# Patient Record
Sex: FEMALE | URBAN - METROPOLITAN AREA
[De-identification: names, ages, dates, MRNs, and addresses within clinical notes are randomized per-mention and may not be internally consistent; named-entity substitution may affect disease eponyms.]

---

## 2017-04-10 ENCOUNTER — THERAPY VISIT (OUTPATIENT)
Dept: PHYSICAL THERAPY | Facility: CLINIC | Age: 57
End: 2017-04-10
Payer: COMMERCIAL

## 2017-04-10 DIAGNOSIS — M47.812 CERVICAL SPONDYLOSIS WITHOUT MYELOPATHY: Primary | ICD-10-CM

## 2017-04-10 DIAGNOSIS — M54.2 NECK PAIN: ICD-10-CM

## 2017-04-10 PROCEDURE — 97162 PT EVAL MOD COMPLEX 30 MIN: CPT | Mod: GP | Performed by: PHYSICAL THERAPIST

## 2017-04-10 PROCEDURE — 97110 THERAPEUTIC EXERCISES: CPT | Mod: GP | Performed by: PHYSICAL THERAPIST

## 2017-04-10 PROCEDURE — 97530 THERAPEUTIC ACTIVITIES: CPT | Mod: GP | Performed by: PHYSICAL THERAPIST
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
## 2017-04-10 NOTE — MR AVS SNAPSHOT
After Visit Summary   4/10/2017    Rosaura Guzmán    MRN: [id number]           Patient Information     Date Of Birth          1960        Visit Information        Provider Department      4/10/2017 4:20 PM Braulio Schuster [facility] [place] [facility]        Today's Diagnoses     Cervical spondylosis without myelopathy    -  1    Neck pain           Follow-ups after your visit        Your next 10 appointments already scheduled     Apr 17, 2017  4:20 PM CDT   [facility] Spine with Braulio Schuster [facility] [place] [facility] ([facility] [place])    [address] [place] [address]  [address]  [place] [address]   [phone]            Apr 24, 2017  4:20 PM CDT   LUCERO Spine with Rahul Weber [facility] [place] [facility] ([facility] [place])    [address] [place] [address]  [address]  [place] [address]   [phone]            May 01, 2017  4:20 PM CDT   LUCERO Spine with Braulio Schuster [facility] [place] [facility] ([facility] [place])    [address]  [place] [address]   [phone]            May 08, 2017  4:20 PM CDT   LUCERO Spine with Braulio Schuster [facility] [place] [facility] ([facility] [place])    [address]  [place] [address]   [phone]            May 15, 2017  4:20 PM CDT   LUCERO Spine with Braulio Schuster [facility] [place] [facility] ([facility] [place])    [address] [place], [place] [address]  [place] [address]   [phone]            May 22, 2017  4:20 PM CDT   LUCERO Spine with Braulio Schuster [facility] [place] [facility] ([facility] [place])    [address] [place], [place] [address]  [place] [id number] [phone]              Who to contact     If you have questions or need follow up information about  "today's clinic visit or your schedule please contact [facility] directly at [phone].  Normal or non-critical lab and imaging results will be communicated to you by MyChart, letter or phone within 4 business days after the clinic has received the results. If you do not hear from us within 7 days, please contact the clinic through LogMeInhart or phone. If you have a critical or abnormal lab result, we will notify you by phone as soon as possible.  Submit refill requests through Zoodig or call your pharmacy and they will forward the refill request to us. Please allow 3 business days for your refill to be completed.          Additional Information About Your Visit        LogMeInharKincast Information     Zoodig lets you send messages to your doctor, view your test results, renew your prescriptions, schedule appointments and more. To sign up, go to [email or web address] . Click on \"Log in\" on the left side of the screen, which will take you to the Welcome page. Then click on \"Sign up Now\" on the right side of the page.     You will be asked to enter the access code listed below, as well as some personal information. Please follow the directions to create your username and password.     Your access code is: [email or web address]  Expires: 2017  9:09 PM     Your access code will  in 90 days. If you need help or a new code, please call your [facility] clinic or [phone].        Care EveryWhere ID     This is your Care EveryWhere ID. This could be used by other organizations to access your [facility] medical records  [id number]         Blood Pressure from Last 3 Encounters:   No data found for BP    Weight from Last 3 Encounters:   No data found for Wt              We Performed the Following     L[facility] INITIAL EVAL REPORT     PT Eval, Moderate Complexity (49392)     THERAPEUTIC ACTIVITIES     THERAPEUTIC EXERCISES        Primary Care Provider    None Specified       No primary " provider on file.        Thank you!     Thank you for choosing [facility]  for your care. Our goal is always to provide you with excellent care. Hearing back from our patients is one way we can continue to improve our services. Please take a few minutes to complete the written survey that you may receive in the mail after your visit with us. Thank you!             Your Updated Medication List - Protect others around you: Learn how to safely use, store and throw away your medicines at www.disposemymeds.org.      Notice  As of 4/10/2017  9:09 PM    You have not been prescribed any medications.
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
## 2017-04-13 NOTE — PROGRESS NOTES
Subjective:    Patient is a 57 year old female presenting with rehab left ankle/foot hpi.                                      Pertinent medical history includes:  Migraines (Pain at night/rest, numbness/tingling, weakness).    Surgical history: Foot 1992.  Current medications:  Hormone replacement therapy and pain medication.  Current occupation .    Primary job tasks include:  Prolonged sitting, prolonged standing and driving (computer work).              Oswestry Score: 37.78 %                 Objective:    System    Physical Exam    General     ROS    Assessment/Plan:

## 2017-04-17 ENCOUNTER — THERAPY VISIT (OUTPATIENT)
Dept: PHYSICAL THERAPY | Facility: CLINIC | Age: 57
End: 2017-04-17
Payer: COMMERCIAL

## 2017-04-17 DIAGNOSIS — M54.2 NECK PAIN: ICD-10-CM

## 2017-04-17 DIAGNOSIS — M47.812 CERVICAL SPONDYLOSIS WITHOUT MYELOPATHY: ICD-10-CM

## 2017-04-17 PROCEDURE — 97140 MANUAL THERAPY 1/> REGIONS: CPT | Mod: GP | Performed by: PHYSICAL THERAPIST

## 2017-04-17 PROCEDURE — 97110 THERAPEUTIC EXERCISES: CPT | Mod: GP | Performed by: PHYSICAL THERAPIST

## 2017-04-24 ENCOUNTER — THERAPY VISIT (OUTPATIENT)
Dept: PHYSICAL THERAPY | Facility: CLINIC | Age: 57
End: 2017-04-24
Payer: COMMERCIAL

## 2017-04-24 DIAGNOSIS — M47.812 CERVICAL SPONDYLOSIS WITHOUT MYELOPATHY: ICD-10-CM

## 2017-04-24 DIAGNOSIS — M54.2 NECK PAIN: ICD-10-CM

## 2017-04-24 PROCEDURE — 97110 THERAPEUTIC EXERCISES: CPT | Mod: GP

## 2017-04-24 PROCEDURE — 97112 NEUROMUSCULAR REEDUCATION: CPT | Mod: GP

## 2017-05-05 ENCOUNTER — THERAPY VISIT (OUTPATIENT)
Dept: PHYSICAL THERAPY | Facility: CLINIC | Age: 57
End: 2017-05-05
Payer: COMMERCIAL

## 2017-05-05 DIAGNOSIS — M54.2 NECK PAIN: ICD-10-CM

## 2017-05-05 DIAGNOSIS — M47.812 CERVICAL SPONDYLOSIS WITHOUT MYELOPATHY: ICD-10-CM

## 2017-05-05 PROCEDURE — 97110 THERAPEUTIC EXERCISES: CPT | Mod: GP | Performed by: PHYSICAL THERAPIST

## 2017-05-05 PROCEDURE — 97112 NEUROMUSCULAR REEDUCATION: CPT | Mod: GP | Performed by: PHYSICAL THERAPIST

## 2017-05-05 NOTE — PROGRESS NOTES
Subjective:    HPI                    Objective:    System    Physical Exam    General     ROS    Assessment/Plan:      SUBJE  Subjective changes as noted by pt:  No change in cervical spine Subjective: Pt reports LBP flare up today for unknown reasons.  cervical symptoms unchanged.     Current pain level: 8/10 Current Pain level: 8/10   Changes in function:  None     Adverse reaction to treatment or activity:  None    OBJECTIVE  Changes in objective findings:   Objective: guarded posture, LBP at 8/10 prior to pain meds. See MD today for shots in the cervical spine.     ASSESSMENT  Rosaura continues to require intervention to meet STG and LTG's: PT  Patient is progressing as expected.  Response to therapy has shown an improvement in  pain level  Progress made towards STG/LTG?  Yes (See Goal flowsheet attached for updates on achievement of STG and LTG)    PLAN  Current treatment program is being advanced to more complex exercises.    PTA/ATC plan:  N/A    Please refer to the daily flowsheet for treatment today, total treatment time and time spent performing 1:1 timed codes.

## 2017-05-05 NOTE — MR AVS SNAPSHOT
"              After Visit Summary   5/5/2017    Rosaura Guzmán    MRN: [id number]           Patient Information     Date Of Birth          1960        Visit Information        Provider Department      5/5/2017 9:30 AM Anitra Yan, PT [facility]        Today's Diagnoses     Cervical spondylosis without myelopathy        Neck pain           Follow-ups after your visit        Your next 10 appointments already scheduled     May 10, 2017  3:50 PM CDT   [facility] with Anh Dumont PT   [facility] ([facility] [place])    [address] [place] [address]  [id number]  [place] [address] [phone]              Who to contact     If you have questions or need follow up information about today's clinic visit or your schedule please contact [facility] directly at [phone].  Normal or non-critical lab and imaging results will be communicated to you by Ripple Commercehart, letter or phone within 4 business days after the clinic has received the results. If you do not hear from us within 7 days, please contact the clinic through TrashOutt or phone. If you have a critical or abnormal lab result, we will notify you by phone as soon as possible.  Submit refill requests through [employer] or call your pharmacy and they will forward the refill request to us. Please allow 3 business days for your refill to be completed.          Additional Information About Your Visit        Ripple [employer] Information     [employer] lets you send messages to your doctor, view your test results, renew your prescriptions, schedule appointments and more. To sign up, go to [email or web address] . Click on \"Log in\" on the left side of the screen, which will take you to the Welcome page. Then click on \"Sign up Now\" on the right side of the page.     You will be asked to enter the access code listed below, as well as " some personal information. Please follow the directions to create your username and password.     Your access code is: [email or web address]  Expires: 2017  9:09 PM     Your access code will  in 90 days. If you need help or a new code, please call your [place] clinic or [phone].        Care EveryWhere ID     This is your Care EveryWhere ID. This could be used by other organizations to access your [place] medical records  [id number]         Blood Pressure from Last 3 Encounters:   No data found for BP    Weight from Last 3 Encounters:   No data found for Wt              We Performed the Following     Neuromuscular Re-Education     Therapeutic Exercises        Primary Care Provider    None Specified       No primary provider on file.        Thank you!     Thank you for choosing [facility]  for your care. Our goal is always to provide you with excellent care. Hearing back from our patients is one way we can continue to improve our services. Please take a few minutes to complete the written survey that you may receive in the mail after your visit with us. Thank you!             Your Updated Medication List - Protect others around you: Learn how to safely use, store and throw away your medicines at www.disposemymeds.org.      Notice  As of 2017 12:24 PM    You have not been prescribed any medications.

## 2018-02-05 ENCOUNTER — THERAPY VISIT (OUTPATIENT)
Dept: PHYSICAL THERAPY | Facility: CLINIC | Age: 58
End: 2018-02-05
Payer: COMMERCIAL

## 2018-02-05 DIAGNOSIS — M54.50 LUMBAGO: Primary | ICD-10-CM

## 2018-02-05 PROCEDURE — 97110 THERAPEUTIC EXERCISES: CPT | Mod: GP | Performed by: PHYSICAL THERAPIST

## 2018-02-05 PROCEDURE — 97161 PT EVAL LOW COMPLEX 20 MIN: CPT | Mod: GP | Performed by: PHYSICAL THERAPIST
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
## 2018-02-09 ENCOUNTER — THERAPY VISIT (OUTPATIENT)
Dept: PHYSICAL THERAPY | Facility: CLINIC | Age: 58
End: 2018-02-09
Payer: COMMERCIAL

## 2018-02-09 DIAGNOSIS — M54.50 LUMBAGO: ICD-10-CM

## 2018-02-09 PROCEDURE — 97110 THERAPEUTIC EXERCISES: CPT | Mod: GP | Performed by: PHYSICAL THERAPIST

## 2018-02-09 PROCEDURE — 97140 MANUAL THERAPY 1/> REGIONS: CPT | Mod: GP | Performed by: PHYSICAL THERAPIST

## 2018-02-09 PROCEDURE — 97010 HOT OR COLD PACKS THERAPY: CPT | Mod: GP | Performed by: PHYSICAL THERAPIST

## 2018-02-12 ENCOUNTER — THERAPY VISIT (OUTPATIENT)
Dept: PHYSICAL THERAPY | Facility: CLINIC | Age: 58
End: 2018-02-12
Payer: COMMERCIAL

## 2018-02-12 DIAGNOSIS — M54.50 LUMBAGO: ICD-10-CM

## 2018-02-12 PROCEDURE — 97140 MANUAL THERAPY 1/> REGIONS: CPT | Mod: GP | Performed by: PHYSICAL THERAPIST

## 2018-02-12 PROCEDURE — 97110 THERAPEUTIC EXERCISES: CPT | Mod: GP | Performed by: PHYSICAL THERAPIST

## 2018-02-12 PROCEDURE — 97010 HOT OR COLD PACKS THERAPY: CPT | Mod: GP | Performed by: PHYSICAL THERAPIST

## 2018-02-16 ENCOUNTER — THERAPY VISIT (OUTPATIENT)
Dept: PHYSICAL THERAPY | Facility: CLINIC | Age: 58
End: 2018-02-16
Payer: COMMERCIAL

## 2018-02-16 DIAGNOSIS — M54.50 LUMBAGO: ICD-10-CM

## 2018-02-16 PROCEDURE — 97140 MANUAL THERAPY 1/> REGIONS: CPT | Mod: GP | Performed by: PHYSICAL THERAPIST

## 2018-02-16 PROCEDURE — 97530 THERAPEUTIC ACTIVITIES: CPT | Mod: GP | Performed by: PHYSICAL THERAPIST

## 2018-02-16 PROCEDURE — 97010 HOT OR COLD PACKS THERAPY: CPT | Mod: GP | Performed by: PHYSICAL THERAPIST

## 2018-02-20 ENCOUNTER — THERAPY VISIT (OUTPATIENT)
Dept: PHYSICAL THERAPY | Facility: CLINIC | Age: 58
End: 2018-02-20
Payer: COMMERCIAL

## 2018-02-20 DIAGNOSIS — M54.5 ACUTE LOW BACK PAIN, UNSPECIFIED BACK PAIN LATERALITY, WITH SCIATICA PRESENCE UNSPECIFIED: ICD-10-CM

## 2018-02-20 PROBLEM — M54.2 NECK PAIN: Status: RESOLVED | Noted: 2017-04-10 | Resolved: 2018-02-20

## 2018-02-20 PROBLEM — M47.812 CERVICAL SPONDYLOSIS WITHOUT MYELOPATHY: Status: RESOLVED | Noted: 2017-04-10 | Resolved: 2018-02-20

## 2018-02-20 PROCEDURE — 97110 THERAPEUTIC EXERCISES: CPT | Mod: GP

## 2018-02-20 PROCEDURE — 97010 HOT OR COLD PACKS THERAPY: CPT | Mod: GP

## 2018-02-20 PROCEDURE — 97035 APP MDLTY 1+ULTRASOUND EA 15: CPT | Mod: GP

## 2018-02-23 ENCOUNTER — THERAPY VISIT (OUTPATIENT)
Dept: PHYSICAL THERAPY | Facility: CLINIC | Age: 58
End: 2018-02-23
Payer: COMMERCIAL

## 2018-02-23 DIAGNOSIS — M54.5 ACUTE LOW BACK PAIN, UNSPECIFIED BACK PAIN LATERALITY, WITH SCIATICA PRESENCE UNSPECIFIED: ICD-10-CM

## 2018-02-23 PROCEDURE — 97010 HOT OR COLD PACKS THERAPY: CPT | Mod: GP | Performed by: PHYSICAL THERAPIST

## 2018-02-23 PROCEDURE — 97530 THERAPEUTIC ACTIVITIES: CPT | Mod: GP | Performed by: PHYSICAL THERAPIST

## 2018-02-23 PROCEDURE — 97035 APP MDLTY 1+ULTRASOUND EA 15: CPT | Mod: GP | Performed by: PHYSICAL THERAPIST

## 2018-02-23 NOTE — MR AVS SNAPSHOT
"              After Visit Summary   2018    Rosaura Guzmán    MRN: [id number]           Patient Information     Date Of Birth          1960        Visit Information        Provider Department      2018 4:30 PM Raza Watts PT [place] [facility] Physical Therapy        Today's Diagnoses     Acute low back pain, unspecified back pain laterality, with sciatica presence unspecified           Follow-ups after your visit        Who to contact     If you have questions or need follow up information about today's clinic visit or your schedule please contact [facility]THLETIC [facility] PHYSICAL THERAPY directly at [phone].  Normal or non-critical lab and imaging results will be communicated to you by [employer]ou[employer]art, letter or phone within 4 business days after the clinic has received the results. If you do not hear from us within 7 days, please contact the clinic through [employer] or phone. If you have a critical or abnormal lab result, we will notify you by phone as soon as possible.  Submit refill requests through [email or web address] or call your pharmacy and they will forward the refill request to us. Please allow 3 business days for your refill to be completed.          Additional Information About Your Visit        MyChart Information     [email or web address] lets you send messages to your doctor, view your test results, renew your prescriptions, schedule appointments and more. To sign up, go to [email or web address] . Click on \"Log in\" on the left side of the screen, which will take you to the Welcome page. Then click on \"Sign up Now\" on the right side of the page.     You will be asked to enter the access code listed below, as well as some personal information. Please follow the directions to create your username and password.     Your access code is: [email or web address]  Expires: 2018  5:45 PM     Your access code will  in 90 days. If you need help or a new code, please call " your [place] clinic or [phone].        Care EveryWhere ID     This is your Care EveryWhere ID. This could be used by other organizations to access your [place] medical records  [id number]         Blood Pressure from Last 3 Encounters:   No data found for BP    Weight from Last 3 Encounters:   No data found for Wt              We Performed the Following     HOT OR COLD PACKS THERAPY     THERAPEUTIC ACTIVITIES     ULTRASOUND THERAPY        Primary Care Provider Fax #    Physician No Ref-Primary [phone]       No address on file        Equal Access to Services     JESSE LEPE : Hadii aad ku hadasho Soomaali, waaxda luqadaha, qaybta kaalmada adeegyada, waxay idiin hayaan adeeg kharaluann laobey . So [facility] [phone].    ATENCIÓN: Si habla español, tiene a goff disposición servicios gratuitos de asistencia lingüística. Ll[facility] [phone].    We comply with applicable federal civil rights laws and [place] laws. We do not discriminate on the basis of race, color, national origin, age, disability, sex, sexual orientation, or gender identity.            Thank you!     Thank you for choosing [facility]  for your care. Our goal is always to provide you with excellent care. Hearing back from our patients is one way we can continue to improve our services. Please take a few minutes to complete the written survey that you may receive in the mail after your visit with us. Thank you!             Your Updated Medication List - Protect others around you: Learn how to safely use, store and throw away your medicines at www.disposemymeds.org.      Notice  As of 2/23/2018  5:03 PM    You have not been prescribed any medications.

## 2018-02-23 NOTE — LETTER
[facility] PHYSICAL [address]  [address]  [facility] [id number] [phone]    2018    Re: Rosaura Guzmán   :   1960  MRN:  [id number]   REFERRING PHYSICIAN:   Parviz Maier    [facility]LETIC [facility]    Date of Initial Evaluation:  18  Visits:  Rxs Used: 6  Reason for Referral:  Acute low back pain, unspecified back pain laterality, with sciatica presence unspecified    PROGRESS  REPORT  Progress reporting period is from 18 to 18.       SUBJECTIVE    Subjective changes noted by patient:  Minimal changes noted in LBP over the course of PT.  Subjective: Reports major flare up of pain over the past 24 hours for no apparent reason. Notes pain across B LB. [place] that US was helpful last visit.    Current pain level is 9/10  .     Previous pain level was  4/10 Initial Pain level: 5/10.   Changes in function:  None  Adverse reaction to treatment or activity: activity - Patient reported major increase in sxs over the 24 hours prior to her last PT session for no apparent reason.    OBJECTIVE    Changes noted in objective findings:  None  Objective: Continued with HP followed by US in L sidelying.     ASSESSMENT/PLAN    Updated problem list and treatment plan: Diagnosis 1:  LBP  Pain -  hot/cold therapy, electric stimulation, manual therapy, self management, education and home program  Decreased ROM/flexibility - manual therapy, therapeutic exercise and home program  Impaired muscle performance - neuro re-education and home program  Decreased function - therapeutic activities and home program  STG/LTGs have been met or progress has been made towards goals:  None  Assessment of Progress: The patient's condition is unchanged.  Self Management Plans:  Patient has been instructed in a home treatment program.  I have re-evaluated this patient and find that the nature, scope, duration  and intensity of the therapy is appropriate for the medical condition of the patient.  Rosaura continues to require the following intervention to meet STG and LTG's:  PT intervention is no longer required to meet STG/LTG.    Re: Rosaura Guzmán   :   1960        Recommendations:    This patient would benefit from further evaluation.    Thank you for your referral.    INQUIRIES  Therapist:  Raza Watts PT   [facility]  [address] [place] [address]  [facility] [address]  Phone: [phone]  Fax: [phone]

## 2018-03-01 NOTE — PROGRESS NOTES
Subjective:  HPI                    Objective:  System    Physical Exam    General     ROS    Assessment/Plan:    PROGRESS  REPORT    Progress reporting period is from 2-5-18 to 2-23-18.       SUBJECTIVE  Subjective changes noted by patient:  Minimal changes noted in LBP over the course of PT.  Subjective: Reports major flare up of pain over the past 24 hours for no apparent reason. Notes pain across B LB. [place] that US was helpful last visit.    Current pain level is 9/10  .     Previous pain level was  4/10 Initial Pain level: 5/10.   Changes in function:  None  Adverse reaction to treatment or activity: activity - Patient reported major increase in sxs over the 24 hours prior to her last PT session for no apparent reason.    OBJECTIVE  Changes noted in objective findings:  None  Objective: Continued with HP followed by US in L sid[place]ing.     ASSESSMENT/PLAN  Updated problem list and treatment plan: Diagnosis 1:  LBP  Pain -  hot/cold therapy, electric stimulation, manual therapy, self management, education and home program  Decreased ROM/flexibility - manual therapy, therapeutic exercise and home program  Impaired muscle performance - neuro re-education and home program  Decreased function - therapeutic activities and home program  STG/LTGs have been met or progress has been made towards goals:  None  Assessment of Progress: The patient's condition is unchanged.  Self Management Plans:  Patient has been instructed in a home treatment program.  I have re-evaluated this patient and find that the nature, scope, duration and intensity of the therapy is appropriate for the medical condition of the patient.  Rosaura continues to require the following intervention to meet STG and LTG's:  PT intervention is no longer required to meet STG/LTG.    Recommendations:  This patient would benefit from further evaluation.    Please refer to the daily flowsheet for treatment today, total treatment time and time spent performing  1:1 timed codes.